# Patient Record
Sex: FEMALE | Race: WHITE | NOT HISPANIC OR LATINO | Employment: FULL TIME | URBAN - METROPOLITAN AREA
[De-identification: names, ages, dates, MRNs, and addresses within clinical notes are randomized per-mention and may not be internally consistent; named-entity substitution may affect disease eponyms.]

---

## 2017-04-04 ENCOUNTER — COMMUNICATION - HEALTHEAST (OUTPATIENT)
Dept: FAMILY MEDICINE | Facility: CLINIC | Age: 22
End: 2017-04-04

## 2017-04-04 DIAGNOSIS — F41.9 ANXIETY: ICD-10-CM

## 2017-06-02 ENCOUNTER — COMMUNICATION - HEALTHEAST (OUTPATIENT)
Dept: FAMILY MEDICINE | Facility: CLINIC | Age: 22
End: 2017-06-02

## 2017-06-02 DIAGNOSIS — F41.9 ANXIETY: ICD-10-CM

## 2017-06-02 DIAGNOSIS — G47.00 INSOMNIA: ICD-10-CM

## 2017-06-13 ENCOUNTER — OFFICE VISIT - HEALTHEAST (OUTPATIENT)
Dept: FAMILY MEDICINE | Facility: CLINIC | Age: 22
End: 2017-06-13

## 2017-06-13 ENCOUNTER — COMMUNICATION - HEALTHEAST (OUTPATIENT)
Dept: TELEHEALTH | Facility: CLINIC | Age: 22
End: 2017-06-13

## 2017-06-13 DIAGNOSIS — F51.01 PRIMARY INSOMNIA: ICD-10-CM

## 2017-06-13 DIAGNOSIS — K21.9 GASTROESOPHAGEAL REFLUX DISEASE WITHOUT ESOPHAGITIS: ICD-10-CM

## 2017-06-13 DIAGNOSIS — F41.9 ANXIETY: ICD-10-CM

## 2017-06-13 DIAGNOSIS — Z30.42 ENCOUNTER FOR SURVEILLANCE OF INJECTABLE CONTRACEPTIVE: ICD-10-CM

## 2017-06-13 DIAGNOSIS — G25.81 RLS (RESTLESS LEGS SYNDROME): ICD-10-CM

## 2017-06-13 DIAGNOSIS — G43.909 MIGRAINE: ICD-10-CM

## 2017-06-13 DIAGNOSIS — F32.9 MAJOR DEPRESSIVE DISORDER, SINGLE EPISODE, UNSPECIFIED: ICD-10-CM

## 2017-07-13 ENCOUNTER — COMMUNICATION - HEALTHEAST (OUTPATIENT)
Dept: FAMILY MEDICINE | Facility: CLINIC | Age: 22
End: 2017-07-13

## 2017-07-24 ENCOUNTER — COMMUNICATION - HEALTHEAST (OUTPATIENT)
Dept: FAMILY MEDICINE | Facility: CLINIC | Age: 22
End: 2017-07-24

## 2017-07-25 ENCOUNTER — OFFICE VISIT - HEALTHEAST (OUTPATIENT)
Dept: FAMILY MEDICINE | Facility: CLINIC | Age: 22
End: 2017-07-25

## 2017-07-25 DIAGNOSIS — N92.1 MENORRHAGIA WITH IRREGULAR CYCLE: ICD-10-CM

## 2017-07-25 DIAGNOSIS — M41.9 SCOLIOSIS: ICD-10-CM

## 2017-07-25 DIAGNOSIS — G43.909 MIGRAINE: ICD-10-CM

## 2017-07-25 DIAGNOSIS — Z00.00 ANNUAL PHYSICAL EXAM: ICD-10-CM

## 2017-07-25 DIAGNOSIS — F32.9 MAJOR DEPRESSIVE DISORDER, SINGLE EPISODE, UNSPECIFIED: ICD-10-CM

## 2017-07-25 DIAGNOSIS — F41.1 ANXIETY STATE: ICD-10-CM

## 2017-07-25 ASSESSMENT — MIFFLIN-ST. JEOR: SCORE: 1863.05

## 2017-08-07 ENCOUNTER — COMMUNICATION - HEALTHEAST (OUTPATIENT)
Dept: FAMILY MEDICINE | Facility: CLINIC | Age: 22
End: 2017-08-07

## 2017-08-07 DIAGNOSIS — F41.9 ANXIETY: ICD-10-CM

## 2017-08-09 ENCOUNTER — RECORDS - HEALTHEAST (OUTPATIENT)
Dept: ADMINISTRATIVE | Facility: OTHER | Age: 22
End: 2017-08-09

## 2017-08-15 ENCOUNTER — COMMUNICATION - HEALTHEAST (OUTPATIENT)
Dept: FAMILY MEDICINE | Facility: CLINIC | Age: 22
End: 2017-08-15

## 2017-08-15 DIAGNOSIS — K21.9 GERD (GASTROESOPHAGEAL REFLUX DISEASE): ICD-10-CM

## 2017-10-02 ENCOUNTER — AMBULATORY - HEALTHEAST (OUTPATIENT)
Dept: FAMILY MEDICINE | Facility: CLINIC | Age: 22
End: 2017-10-02

## 2017-10-02 ENCOUNTER — COMMUNICATION - HEALTHEAST (OUTPATIENT)
Dept: FAMILY MEDICINE | Facility: CLINIC | Age: 22
End: 2017-10-02

## 2017-10-02 DIAGNOSIS — N92.0 HEAVY MENSES: ICD-10-CM

## 2017-10-04 ENCOUNTER — AMBULATORY - HEALTHEAST (OUTPATIENT)
Dept: NURSING | Facility: CLINIC | Age: 22
End: 2017-10-04

## 2017-10-04 DIAGNOSIS — N92.0 HEAVY MENSES: ICD-10-CM

## 2017-12-28 ENCOUNTER — AMBULATORY - HEALTHEAST (OUTPATIENT)
Dept: NURSING | Facility: CLINIC | Age: 22
End: 2017-12-28

## 2017-12-28 ENCOUNTER — COMMUNICATION - HEALTHEAST (OUTPATIENT)
Dept: FAMILY MEDICINE | Facility: CLINIC | Age: 22
End: 2017-12-28

## 2018-02-05 ENCOUNTER — COMMUNICATION - HEALTHEAST (OUTPATIENT)
Dept: FAMILY MEDICINE | Facility: CLINIC | Age: 23
End: 2018-02-05

## 2018-02-05 DIAGNOSIS — G43.909 MIGRAINE: ICD-10-CM

## 2018-02-14 ENCOUNTER — COMMUNICATION - HEALTHEAST (OUTPATIENT)
Dept: FAMILY MEDICINE | Facility: CLINIC | Age: 23
End: 2018-02-14

## 2018-02-14 DIAGNOSIS — K21.9 GERD (GASTROESOPHAGEAL REFLUX DISEASE): ICD-10-CM

## 2018-02-14 DIAGNOSIS — F41.9 ANXIETY: ICD-10-CM

## 2018-03-05 ENCOUNTER — COMMUNICATION - HEALTHEAST (OUTPATIENT)
Dept: FAMILY MEDICINE | Facility: CLINIC | Age: 23
End: 2018-03-05

## 2018-03-05 DIAGNOSIS — G47.00 INSOMNIA: ICD-10-CM

## 2018-03-09 ENCOUNTER — COMMUNICATION - HEALTHEAST (OUTPATIENT)
Dept: SCHEDULING | Facility: CLINIC | Age: 23
End: 2018-03-09

## 2018-03-23 ENCOUNTER — OFFICE VISIT - HEALTHEAST (OUTPATIENT)
Dept: FAMILY MEDICINE | Facility: CLINIC | Age: 23
End: 2018-03-23

## 2018-03-23 DIAGNOSIS — F32.4 MAJOR DEPRESSIVE DISORDER WITH SINGLE EPISODE, IN PARTIAL REMISSION (H): ICD-10-CM

## 2018-03-23 DIAGNOSIS — K21.9 GASTROESOPHAGEAL REFLUX DISEASE WITHOUT ESOPHAGITIS: ICD-10-CM

## 2018-03-23 DIAGNOSIS — F41.1 ANXIETY STATE: ICD-10-CM

## 2018-03-23 DIAGNOSIS — G43.909 MIGRAINE: ICD-10-CM

## 2018-03-23 DIAGNOSIS — Z30.42 ENCOUNTER FOR SURVEILLANCE OF INJECTABLE CONTRACEPTIVE: ICD-10-CM

## 2018-03-23 DIAGNOSIS — F51.01 PRIMARY INSOMNIA: ICD-10-CM

## 2018-03-23 RX ORDER — BUSPIRONE HYDROCHLORIDE 15 MG/1
15 TABLET ORAL 3 TIMES DAILY
Qty: 90 TABLET | Refills: 1 | Status: SHIPPED | OUTPATIENT
Start: 2018-03-23

## 2018-03-23 ASSESSMENT — MIFFLIN-ST. JEOR: SCORE: 1961.81

## 2018-04-29 ENCOUNTER — COMMUNICATION - HEALTHEAST (OUTPATIENT)
Dept: FAMILY MEDICINE | Facility: CLINIC | Age: 23
End: 2018-04-29

## 2018-04-29 DIAGNOSIS — F32.4 MAJOR DEPRESSIVE DISORDER WITH SINGLE EPISODE, IN PARTIAL REMISSION (H): ICD-10-CM

## 2018-05-10 ENCOUNTER — OFFICE VISIT - HEALTHEAST (OUTPATIENT)
Dept: FAMILY MEDICINE | Facility: CLINIC | Age: 23
End: 2018-05-10

## 2018-05-10 ENCOUNTER — COMMUNICATION - HEALTHEAST (OUTPATIENT)
Dept: SCHEDULING | Facility: CLINIC | Age: 23
End: 2018-05-10

## 2018-05-10 DIAGNOSIS — F41.0 PANIC ATTACK: ICD-10-CM

## 2018-05-10 DIAGNOSIS — F32.9 MAJOR DEPRESSIVE DISORDER WITH CURRENT ACTIVE EPISODE: ICD-10-CM

## 2018-05-10 RX ORDER — LORAZEPAM 0.5 MG/1
0.5 TABLET ORAL EVERY 6 HOURS PRN
Qty: 10 TABLET | Refills: 0 | Status: SHIPPED | OUTPATIENT
Start: 2018-05-10

## 2018-06-01 ENCOUNTER — COMMUNICATION - HEALTHEAST (OUTPATIENT)
Dept: SCHEDULING | Facility: CLINIC | Age: 23
End: 2018-06-01

## 2018-06-01 RX ORDER — DULOXETIN HYDROCHLORIDE 20 MG/1
20 CAPSULE, DELAYED RELEASE ORAL DAILY
Qty: 30 CAPSULE | Refills: 0 | Status: SHIPPED | OUTPATIENT
Start: 2018-06-01

## 2018-06-13 ENCOUNTER — COMMUNICATION - HEALTHEAST (OUTPATIENT)
Dept: FAMILY MEDICINE | Facility: CLINIC | Age: 23
End: 2018-06-13

## 2018-06-13 DIAGNOSIS — G43.909 MIGRAINE: ICD-10-CM

## 2018-06-13 DIAGNOSIS — G47.00 INSOMNIA: ICD-10-CM

## 2018-06-14 RX ORDER — SUMATRIPTAN 50 MG/1
TABLET, FILM COATED ORAL
Qty: 10 TABLET | Refills: 11 | Status: SHIPPED | OUTPATIENT
Start: 2018-06-14

## 2018-06-14 RX ORDER — TRAZODONE HYDROCHLORIDE 50 MG/1
TABLET, FILM COATED ORAL
Qty: 90 TABLET | Refills: 3 | Status: SHIPPED | OUTPATIENT
Start: 2018-06-14

## 2018-06-21 ENCOUNTER — AMBULATORY - HEALTHEAST (OUTPATIENT)
Dept: FAMILY MEDICINE | Facility: CLINIC | Age: 23
End: 2018-06-21

## 2018-06-21 ENCOUNTER — COMMUNICATION - HEALTHEAST (OUTPATIENT)
Dept: FAMILY MEDICINE | Facility: CLINIC | Age: 23
End: 2018-06-21

## 2018-06-22 ENCOUNTER — AMBULATORY - HEALTHEAST (OUTPATIENT)
Dept: NURSING | Facility: CLINIC | Age: 23
End: 2018-06-22

## 2018-08-08 ENCOUNTER — COMMUNICATION - HEALTHEAST (OUTPATIENT)
Dept: SCHEDULING | Facility: CLINIC | Age: 23
End: 2018-08-08

## 2019-06-20 ENCOUNTER — THERAPY VISIT (OUTPATIENT)
Dept: PHYSICAL THERAPY | Facility: CLINIC | Age: 24
End: 2019-06-20
Payer: COMMERCIAL

## 2019-06-20 DIAGNOSIS — M54.41 ACUTE RIGHT-SIDED LOW BACK PAIN WITH RIGHT-SIDED SCIATICA: ICD-10-CM

## 2019-06-20 PROCEDURE — 97110 THERAPEUTIC EXERCISES: CPT | Mod: GP | Performed by: PHYSICAL THERAPIST

## 2019-06-20 PROCEDURE — 97035 APP MDLTY 1+ULTRASOUND EA 15: CPT | Mod: GP | Performed by: PHYSICAL THERAPIST

## 2019-06-20 PROCEDURE — 97161 PT EVAL LOW COMPLEX 20 MIN: CPT | Mod: GP | Performed by: PHYSICAL THERAPIST

## 2019-06-20 NOTE — PROGRESS NOTES
[facility] Initial Evaluation  Subjective:  Tania Quesada is a 24 year old female with a lumbar spine condition.    The condition occurred due to insidious onset.  The condition occurred for unknown reasons.  This is a new condition.    Mechanism/History of injury/symptoms: 6/18/2019 patient is received physical therapy orders from her chiropractor for low back pain that began in March 2019.  She denies any specific incident that started the pain that she can recall.  The pain is located right lower lumbar radiating to right buttock and right thigh and the quality of pain is reported as sharp/achy.  The degree of pain is reported as 3/10. The timing of pain/symptoms is worse in the a.m and constant. Associated symptoms include: Stiffness loss of motion    Symptoms are exacerbated by: Sitting, prolonged standing, bending.  Symptoms are relieved by: Ibuprofen, ice.  Since onset symptoms are gradually worsening.    Special tests for this condition include: X-ray.  Previous treatments for this condition include: Chiropractic.    General health as reported by patient is good.  Pertinent medical history includes: Depression, migraines/headaches.  Medical allergies include: None.  Other surgeries include: None.  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  Patient's home/work tasks include: Computer work, lifting, carrying, driving.  Patient is currently working normal job without restrictions.    Potential barriers to physical therapy: None.  Red flags: None.    Previous level of function: Unrestricted sitting and driving or standing without back pain.  Current functional restrictions: Limited ability to stand or sit for prolonged periods due to back pain rating to right leg.    HPI                    Objective:  LUMBAR:    Posture: Fair  Posture Correction: Better  Relevant Lateral Shift: No    Neurological:    Motor Deficit:  Myotomes within normal limits  Sensory Deficit, Reflexes: Within normal limits    Dural Signs:   L R   Slump  + R lumbar ++R lumbar   SLR - +R lumbar       AROM: (Major, Moderate, Minimal or Nil loss)  Movement Loss Jordan Mod Min Nil Pain   Flexion   x x none   Extension  x   R lumbar   Side Gliding L    x none   Side Gliding R    x none     Repeated movement testing:   (During: produces, abolishes, increases, decreases, no effect, centralizing, peripheralizing; After: better, worse, no better, no worse, no effect, centralized, peripheralized)    Pre-test Symptoms Standing: Minimal right-sided low back pain   Symptoms During Symptoms After ROM increased ROM decreased No Effect   FIS  no effect  no effect      Rep FIS  no effect  no effect   extension    EIS  increase  no worse      Rep EIS  increase  no worse  extension     Pre-test Symptoms Lying: No pain   Symptoms During Symptoms After ROM increased ROM decreased No Effect   KAYLA        Rep KAYLA        EIL  produce  no worse       Rep EIL produce No worse extension       Provisional Classification: posterior lumbar derangement  Principle of Management: extension with posture correction when sitting with towel roll/pillow      System    Physical Exam    General     ROS    Assessment/Plan:    Patient is a 24 year old female with lumbar complaints.    Patient has the following significant findings with corresponding treatment plan.                Diagnosis 1:  Low back pain with Right radiculopathy    Pain -  hot/cold therapy, US, electric stimulation, manual therapy, self management, education, directional preference exercise and home program  Decreased ROM/flexibility - manual therapy, therapeutic exercise and home program  Decreased strength - therapeutic exercise, therapeutic activities and home program  Decreased function - therapeutic activities and home program  Impaired posture - neuro re-education and home program    Therapy Evaluation Codes:   1) History  comprised of:   Personal factors that impact the plan of care:      None.    Comorbidity factors that impact the plan of care are:      Depression and Migraines/headaches.     Medications impacting care: Anti-depressant and Sleep.  2) Examination of Body Systems comprised of:   Body structures and functions that impact the plan of care:      Lumbar spine.   Activity limitations that impact the plan of care are:      Driving, Lifting, Sitting and Standing.  3) Clinical presentation characteristics are:   Stable/Uncomplicated.  4) Decision-Making    Low complexity using standardized patient assessment instrument and/or measureable assessment of functional outcome.  Cumulative Therapy Evaluation is: Low complexity.    Previous and current functional limitations:  (See Goal Flow Sheet for this information)    Short term and Long term goals: (See Goal Flow Sheet for this information)     Communication ability:  Patient appears to be able to clearly communicate and understand verbal and written communication and follow directions correctly.  Treatment Explanation - The following has been discussed with the patient:   RX ordered/plan of care  Anticipated outcomes  Possible risks and side effects  This patient would benefit from PT intervention to resume normal activities.   Rehab potential is good.    Frequency:  1 X week, once daily  Duration:  for 6 weeks  Discharge Plan:  Achieve all LTG.  Independent in home treatment program.  Reach maximal therapeutic benefit.    Please refer to the daily flowsheet for treatment today, total treatment time and time spent performing 1:1 timed codes.

## 2019-06-20 NOTE — LETTER
[facility]  [address]  [place] [address]  [phone]    2019    Re: Tania Quesada   :   1995  MRN:  [id number]   REFERRING PHYSICIAN:   [facility]    Date of Initial Evaluation:  19  Visits:  Rxs Used: 1  Reason for Referral:  Acute right-sided low back pain with right-sided sciatica    [facility] Initial Evaluation    Subjective:  Tania Quesada is a 24 year old female with a lumbar spine condition.    The condition occurred due to insidious onset.  The condition occurred for unknown reasons.  This is a new condition.  Mechanism/History of injury/symptoms: 2019 patient is received physical therapy orders from her chiropractor for low back pain that began in 2019.  She denies any specific incident that started the pain that she can recall.  The pain is located right lower lumbar radiating to right buttock and right thigh and the quality of pain is reported as sharp/achy.  The degree of pain is reported as 3/10. The timing of pain/symptoms is worse in the a.m and constant. Associated symptoms include: Stiffness loss of motion  Symptoms are exacerbated by: Sitting, prolonged standing, bending.  Symptoms are relieved by: Ibuprofen, ice.  Since onset symptoms are gradually worsening.  Special tests for this condition include: X-ray.  Previous treatments for this condition include: Chiropractic.  General health as reported by patient is good.  Pertinent medical history includes: Depression, migraines/headaches.  Medical allergies include: None.  Other surgeries include: None.  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  Patient's home/work tasks include: Computer work, lifting, carrying, driving.  Patient is currently working normal job without restrictions.  Potential barriers to physical therapy: None.  Red flags: None.    Previous  level of function: Unrestricted sitting and driving or standing without back pain.  Current functional restrictions: Limited ability to stand or sit for prolonged periods due to back pain rating to right leg.    Objective:  LUMBAR:    Posture: Fair  Posture Correction: Better  Relevant Lateral Shift: No    Neurological:  Motor Deficit: Myotomes within normal limits  Sensory Deficit, Reflexes: Within normal limits    Dural Signs:   L R   Slump  + R lumbar ++R lumbar   SLR - +R lumbar       AROM: (Major, Moderate, Minimal or Nil loss)  Movement Loss Jordan Mod Min Nil Pain   Flexion   x x none   Extension  x   R lumbar   Side Gliding L    x none   Side Gliding R    x none     Repeated movement testing:   (During: produces, abolishes, increases, decreases, no effect, centralizing, peripheralizing; After: better, worse, no better, no worse, no effect, centralized, peripheralized)    Pre-test Symptoms Standing: Minimal right-sided low back pain   Symptoms During Symptoms After ROM increased ROM decreased No Effect   FIS  no effect  no effect      Rep FIS  no effect  no effect   extension    EIS  increase  no worse      Rep EIS  increase  no worse  extension     Pre-test Symptoms Lying: No pain   Symptoms During Symptoms After ROM increased ROM decreased No Effect   KAYLA        Rep KAYLA        EIL  produce  no worse       Rep EIL produce No worse extension       Provisional Classification: posterior lumbar derangement    Principle of Management: extension with posture correction when sitting with towel roll/pillow    Assessment/Plan:    Patient is a 24 year old female with lumbar complaints.    Patient has the following significant findings with corresponding treatment plan.                Diagnosis 1:  Low back pain with Right radiculopathy    Pain -  hot/cold therapy, US, electric stimulation, manual therapy, self management, education, directional preference exercise and home program  Decreased ROM/flexibility - manual  therapy, therapeutic exercise and home program  Decreased strength - therapeutic exercise, therapeutic activities and home program  Decreased function - therapeutic activities and home program  Impaired posture - neuro re-education and home program    Therapy Evaluation Codes:   1) History comprised of:   Personal factors that impact the plan of care:      None.    Comorbidity factors that impact the plan of care are:      Depression and Migraines/headaches.     Medications impacting care: Anti-depressant and Sleep.  2) Examination of Body Systems comprised of:   Body structures and functions that impact the plan of care:      Lumbar spine.   Activity limitations that impact the plan of care are:      Driving, Lifting, Sitting and Standing.  3) Clinical presentation characteristics are:   Stable/Uncomplicated.  4) Decision-Making    Low complexity using standardized patient assessment instrument and/or measureable assessment of functional outcome.    Cumulative Therapy Evaluation is: Low complexity.  Previous and current functional limitations:  (See Goal Flow Sheet for this information)    Short term and Long term goals: (See Goal Flow Sheet for this information)   Communication ability:  Patient appears to be able to clearly communicate and understand verbal and written communication and follow directions correctly.  Treatment Explanation - The following has been discussed with the patient:   RX ordered/plan of care  Anticipated outcomes  Possible risks and side effects  This patient would benefit from PT intervention to resume normal activities.   Rehab potential is good.    Frequency:  1 X week, once daily  Duration:  for 6 weeks  Discharge Plan:  Achieve all LTG.  Independent in home treatment program.  Reach maximal therapeutic benefit.    Thank you for your referral.    INQUIRIES  Therapist: Delano Munoz, PT, DPT, SCS  [facility]  [address] [place] [address]  Phone:  [phone]  Fax: [phone]

## 2019-06-28 ENCOUNTER — THERAPY VISIT (OUTPATIENT)
Dept: PHYSICAL THERAPY | Facility: CLINIC | Age: 24
End: 2019-06-28
Payer: COMMERCIAL

## 2019-06-28 DIAGNOSIS — M54.41 ACUTE RIGHT-SIDED LOW BACK PAIN WITH RIGHT-SIDED SCIATICA: ICD-10-CM

## 2019-06-28 PROCEDURE — 97035 APP MDLTY 1+ULTRASOUND EA 15: CPT | Mod: GP | Performed by: PHYSICAL THERAPIST

## 2019-06-28 PROCEDURE — 97110 THERAPEUTIC EXERCISES: CPT | Mod: GP | Performed by: PHYSICAL THERAPIST

## 2019-10-01 PROBLEM — M54.41 ACUTE RIGHT-SIDED LOW BACK PAIN WITH RIGHT-SIDED SCIATICA: Status: RESOLVED | Noted: 2019-06-20 | Resolved: 2019-10-01

## 2019-10-01 NOTE — PROGRESS NOTES
Patient is discharged from PT.  Please refer to SOAP note dated 6/28/19 for last known functional status as well as final objective/subjective values.

## 2020-01-03 ENCOUNTER — RECORDS - HEALTHEAST (OUTPATIENT)
Dept: ADMINISTRATIVE | Facility: OTHER | Age: 25
End: 2020-01-03

## 2021-05-25 ENCOUNTER — RECORDS - HEALTHEAST (OUTPATIENT)
Dept: ADMINISTRATIVE | Facility: CLINIC | Age: 26
End: 2021-05-25

## 2021-05-31 VITALS — BODY MASS INDEX: 34.84 KG/M2 | WEIGHT: 235.9 LBS

## 2021-05-31 VITALS — BODY MASS INDEX: 34.9 KG/M2 | HEIGHT: 69 IN | WEIGHT: 235.6 LBS

## 2021-06-01 VITALS — WEIGHT: 254.7 LBS | BODY MASS INDEX: 36.46 KG/M2 | HEIGHT: 70 IN

## 2021-06-01 VITALS — BODY MASS INDEX: 37.08 KG/M2 | WEIGHT: 256.7 LBS

## 2021-06-11 NOTE — PROGRESS NOTES
SUBJECTIVE: Tania Quesada is a 22 y.o.   female who presents today  For a med check. She is in need of her Depo-Provera shot. The last one she had with us was last year. She tells me she had one at another facility in between. Either way she is still not within the three month time slot however she tells me she has not had intercourse in greater than one year. Patient is reliable. Patient does Depo-Provera for horrible periods. She notes that she had a worsening of her migraines recently and had a 2 1/2 day bout with a migraine. She needs a refill on her Imitrex. She tells me that her insomnia is better with the trazodone and with the use of ropinirole for her restless leg syndrome. She is currently on 2 mg and believes it's adequate. She is on fluoxetine and BuSpar for depression with anxiety. She was given a PHQ nine and scored zero, she was given NG 87 and scored zero. We discussed weaning down on medication. We also discussed that she is in need of a complete physical so we can get her pap.    OBJECTIVE: /70  Pulse 84  Wt (!) 235 lb 14.4 oz (107 kg)  BMI 34.84 kg/m2  General:  Obese young female in no acute distress  Heart:  Regular rate and rhythm without murmur  Lungs:  Clear bilaterally  Abdomen:  Soft, nontender  Extremities:  Warm, dry and without edema    ASSESSMENT & PLAN:    1. Encounter for surveillance of injectable contraceptive  medroxyPROGESTERone injection 150 mg (DEPO-PROVERA)   2. RLS (restless legs syndrome)     3. Primary insomnia     4. Migraine  SUMAtriptan (IMITREX) 50 MG tablet   5. Major Depression, Single Episode     6. Anxiety  FLUoxetine (PROZAC) 40 MG capsule    busPIRone (BUSPAR) 10 MG tablet   7. Gastroesophageal reflux disease without esophagitis        She was given the Depo-Provera injection today and coached on when she needs to be seen next for repeat. At that time she should have a complete physical so we can do APAP. I refilled her Imitrex. I will  refill her other meds as needed.    Patient Active Problem List   Diagnosis     Major Depression, Single Episode     Anxiety     Vocal Cord Disorder     Heavy menstrual bleeding     Menses painful     Migraine     Scoliosis     Obesity (BMI 30.0-34.9)     Acid reflux     RLS (restless legs syndrome)     Insomnia       Current Outpatient Prescriptions on File Prior to Visit   Medication Sig Dispense Refill     omeprazole (PRILOSEC) 20 MG capsule TAKE 1 CAPSULE BY MOUTH DAILY 90 capsule 1     rOPINIRole (REQUIP) 2 MG tablet Take 1 tablet (2 mg total) by mouth bedtime as needed, may repeat once. 60 tablet 1     traZODone (DESYREL) 50 MG tablet Take 1 tablet (50 mg total) by mouth at bedtime. 30 tablet 0     [DISCONTINUED] busPIRone (BUSPAR) 10 MG tablet TAKE 1 TABLET BY MOUTH TWICE DAILY 180 tablet 1     [DISCONTINUED] FLUoxetine (PROZAC) 40 MG capsule TAKE 2 CAPSULES BY MOUTH DAILY 60 capsule 0     No current facility-administered medications on file prior to visit.

## 2021-06-12 NOTE — PROGRESS NOTES
Assessment:      Healthy female exam.    1. Annual physical exam  Gynecologic Cytology (PAP Smear)   2. Menorrhagia with irregular cycle     3. Major Depression, Single Episode     4. Anxiety     5. Migraine     6. Scoliosis  Ambulatory referral to Orthopedic Surgery     The following high BMI interventions were performed this visit: encouragement to exercise, weight monitoring and lifestyle education regarding diet       Plan:       Await pap smear results.  She will get results via my chart and I will only call with abnormal results.  Referral to [facility] was given for her scoliosis.  Contraception: Depo-Provera injections.  Discussed healthy lifestyle modifications.  Follow up: Return in about 6 months (around 1/25/2018) for 6 month med check.     Subjective:      Tania Quesada is a 22 y.o. female who presents for an annual exam. The patient is not currently sexually active. The patient participates in regular exercise: yes. The patient reports that there is not domestic violence in her life.  Patient is here to get her first Pap smear.  She has not been sexually active currently but is getting Depo-Provera shots for irregular and heavy periods.  She has a history of migraines that worsened when she missed a Depo-Provera shot but that has improved.  She has some depression and anxiety which was well controlled when I saw her last June.  Her restless leg syndrome has improved and she is not sure why.  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  She believes she has a good diet and enjoys fruits and vegetables etc.  She limits snacking food etc.  Her only complaint is she had a period of time for about a month where she was having her scoliosis bother her.  Ibuprofen would help.  It seems to be better now.  She would like to have a referral so that she can be proactive.  She thinks that her past physical therapy helped her  condition a lot and she has not seen a specialist for many years.  We discussed somewhat.  She is up-to-date on her immunizations.    Healthy Habits:   Regular Exercise: Yes  Sunscreen Use: Yes  Healthy Diet: Yes  Dental Visits Regularly: Yes  Seat Belt: Yes  Sexually active: No  Self Breast Exam Monthly:No  Hemoccults: No  Flex Sig: No  Colonoscopy: No  Lipid Profile: Yes  Glucose Screen: Yes  Prevention of Osteoporosis: Yes  Last Dexa: No  Guns at Home:  N/A      Immunization History   Administered Date(s) Administered     DT (pediatric) 08/04/2005     DTaP, historic 1995, 1995, 1995, 10/24/1994, 12/30/1997     HPV Quadrivalent 08/02/2007, 06/17/2008, 12/10/2008     Hep A, historic 08/02/2007, 06/17/2008     Hep B, historic 1993, 1993, 1993     HiB, historic 1995, 1993, 1993, 07/25/1994     IPV 1995, 1993, 10/24/1994, 12/30/1997     Influenza [id number], 01/20/2010     Influenza, inj, historic 12/10/2008, 09/15/2009, 01/05/2012, 11/23/2016     MMR 07/25/1994, 10/03/2003     Meningococcal MCV4P 06/17/2008, 07/25/2012     Tdap 06/17/2008     Varicella 04/13/1998, 06/17/2008     Immunization status: up to date and documented.    No exam data present    Gynecologic History  No LMP recorded. Patient has had an injection.  Contraception: Depo-Provera injections  Last Pap: N/A. Results were: N/A  Last mammogram: N/A. Results were: N/A      OB History   No data available       Current Outpatient Prescriptions   Medication Sig Dispense Refill     busPIRone (BUSPAR) 10 MG tablet TAKE 1 TABLET BY MOUTH TWICE DAILY 180 tablet 1     FLUoxetine (PROZAC) 40 MG capsule TAKE 1 CAPSULE BY MOUTH DAILY 90 capsule 1     omeprazole (PRILOSEC) 20 MG capsule TAKE 1 CAPSULE BY MOUTH DAILY 90 capsule 1     rOPINIRole (REQUIP) 2 MG tablet Take 1 tablet (2 mg total) by mouth bedtime as needed, may repeat once. 60 tablet 1     SUMAtriptan (IMITREX) 50 MG tablet Take 1 tablet (50  mg total) by mouth every 2 (two) hours as needed for migraine. 9 tablet 2     Current Facility-Administered Medications   Medication Dose Route Frequency Provider Last Rate Last Dose     medroxyPROGESTERone injection 150 mg (DEPO-PROVERA)  150 mg Intramuscular Q3 Months Jami Hawkins MD   150 mg at 06/13/17 1149     Past Medical History:   Diagnosis Date     Anxiety      Disorder of vocal cords      GERD (gastroesophageal reflux disease)      Major depression, controlled      Scoliosis      Past Surgical History:   Procedure Laterality Date     VA REMOVE TONSILS/ADENOIDS,<11 Y/O      Description: Tonsillectomy With Adenoidectomy;  Recorded: 04/25/2008;  Comments: 12/98     Cefixime and Cefprozil  Family History   Problem Relation Age of Onset     BOBBI disease Mother      Breast cancer Mother      Asthma Brother      Ovarian cancer Maternal Grandmother      Lung cancer Maternal Grandfather      Diabetes type II Paternal Grandmother      Leukemia Paternal Grandmother      Lung cancer Paternal Grandfather      BOBBI disease Maternal Aunt      Social History     Social History     Marital status: Single     Spouse name: N/A     Number of children: 0     Years of education: 16     Occupational History     student      [id number] for [employer]     Social History Main Topics     Smoking status: Never Smoker     Smokeless tobacco: Never Used     Alcohol use Yes      Comment: 3-4 on weekends     Drug use: No     Sexual activity: No     Other Topics Concern     Not on file     Social History Narrative       Review of Systems  Review of Systems   General ROS: recent spell/fainting? At derm procedure  Psychological ROS: negative  Ophthalmic ROS: negative  ENT ROS: positive for - gags in AM  Allergy and Immunology ROS: negative  Hematological and Lymphatic ROS: negative  Endocrine ROS: negative  Breast ROS: negative  Respiratory ROS: negative  Cardiovascular ROS: negative  Gastrointestinal ROS:  "negative  Genito-Urinary ROS: positive for - dysmenorrhea,on Depo  Musculoskeletal ROS: positive for - back pain  Neurological ROS: negative  Dermatological ROS: positive for recent biopsy--OK        Objective:         Vitals:    07/25/17 1358   BP: 120/60   Pulse: 78   Temp: 99.1  F (37.3  C)   Weight: (!) 235 lb 9.6 oz (106.9 kg)   Height: 5' 9\" (1.753 m)     Body mass index is 34.79 kg/(m^2).    Physical  Physical Exam   General appearance - alert, well appearing, and in no distress and overweight  Mental status - normal mood, behavior, speech, dress, motor activity, and thought processes  Eyes - pupils equal and reactive, extraocular eye movements intact  Ears - bilateral TM's and external ear canals normal  Nose - normal and patent, no erythema, discharge or polyps  Mouth - mucous membranes moist, pharynx normal without lesions  Neck - supple, no significant adenopathy, thyroid exam: thyroid is normal in size without nodules or tenderness  Lymphatics - no palpable lymphadenopathy, no hepatosplenomegaly  Chest - clear to auscultation, no wheezes, rales or rhonchi, symmetric air entry  Heart - normal rate and regular rhythm, S1 and S2 normal, no murmurs noted  Abdomen - soft, nontender, nondistended, no masses or organomegaly  bowel sounds normal  Breasts - breasts appear normal, no suspicious masses, no skin or nipple changes or axillary nodes  Pelvic - normal external genitalia, vulva, vagina, cervix, uterus and adnexa, PAP: Pap smear done today  Back exam - full range of motion, no tenderness, palpable spasm or pain on motion, scoliosis noted   Neurological - alert, oriented, normal speech, no focal findings or movement disorder noted, cranial nerves II through XII intact, DTR's normal and symmetric  Musculoskeletal - no joint tenderness, deformity or swelling  Extremities - peripheral pulses normal, no pedal edema, no clubbing or cyanosis  Skin - normal coloration and turgor, no rashes, no suspicious skin " lesions noted

## 2021-06-17 NOTE — PROGRESS NOTES
Chief Complaint   Patient presents with     Depression     Pt recently switched from Prozac to Wellbutrin and since starting full dose on the Wellbutrin she has been having suicidal thoughts. No active thoughts presently. Pt also has had two panic attacks in the last week with difficulty breathing.        HPI: Very pleasant 23-year-old female who presents today with her mother for evaluation of depression, panic attacks, and suicidal ideation.  She has a long history of depressive issues dating back to when she was in middle school.  She recently transitioned off of fluoxetine in favor of Wellbutrin.  Ever since transitioning to the Wellbutrin her depression symptoms have worsened and she has been having transient thoughts of suicidal ideation.  She has no active plan to harm her self or a method in which she can hurt herself.  There is a shotgun at the home which she is living at, however it is currently unloaded.  She is unsure if it is locked.  She currently lives with her parents and has a strong social support system.  There is no family history of suicide or self-harm that she is aware of.    The patient has experienced 2 panic attacks within the last week.  She has a history of panic attacks in the past, however never at this frequent.  No known triggers, however one started while she was at a concert that was packed with people and another started after fighting with a friend.  The symptoms usually last for about 15 minutes and resolve spontaneously.    ROS:Review of Systems - History obtained from the patient  General ROS: negative  Psychological ROS: positive for - anxiety, depression and suicidal ideation    SH: The Patient's  reports that she has never smoked. She has never used smokeless tobacco. She reports that she drinks alcohol. She reports that she does not use illicit drugs.      FH: The Patient's family history includes Asthma in her brother; Breast cancer in her mother; Diabetes type II in her  paternal grandmother; BOBBI disease in her maternal aunt and mother; Leukemia in her paternal grandmother; Lung cancer in her maternal grandfather and paternal grandfather; Ovarian cancer in her maternal grandmother.     Meds:    Current Outpatient Prescriptions on File Prior to Visit   Medication Sig Dispense Refill     buPROPion (WELLBUTRIN XL) 300 MG 24 hr tablet Take 1 tablet (300 mg total) by mouth daily. 90 tablet 1     busPIRone (BUSPAR) 15 MG tablet Take 1 tablet (15 mg total) by mouth 3 (three) times a day. 90 tablet 1     omeprazole (PRILOSEC) 20 MG capsule TAKE 1 CAPSULE BY MOUTH DAILY 90 capsule 0     SUMAtriptan (IMITREX) 50 MG tablet TAKE 1 TABLET BY MOUTH AS NEEDED FOR MIGRAINE. MAY REPEAT 1 TIME IN A 24 HOUR PERIOD 10 tablet 0     traZODone (DESYREL) 50 MG tablet Take 1 tablet (50 mg total) by mouth at bedtime. 30 tablet 0     No current facility-administered medications on file prior to visit.        O:  /66 (Patient Site: Left Arm, Patient Position: Sitting, Cuff Size: Adult Regular)  Pulse 90  Wt (!) 256 lb 11.2 oz (116.4 kg)  BMI 37.08 kg/m2    Physical Examination: General appearance - alert, well appearing, and in no distress and oriented to person, place, and time  Mental status - alert, oriented to person, place, and time, normal mood, behavior, speech, dress, motor activity, and thought processes      A/P:     Problem List Items Addressed This Visit     None      Visit Diagnoses     Major depressive disorder with current active episode    -  Primary    Relevant Medications    venlafaxine (EFFEXOR XR) 37.5 MG 24 hr capsule    LORazepam (ATIVAN) 0.5 MG tablet    Other Relevant Orders    Ambulatory referral to Psychology    Panic attack        Relevant Medications    LORazepam (ATIVAN) 0.5 MG tablet            1. Major depressive disorder with current active episode  Discussed treatment options and we elected to start an SNRI.  Effexor prescription sent to the pharmacy with directions to  take daily, and call back in 2 weeks with an update.  Hopefully since she is on break from school she will have more time for self-care (exercise, eating healthier).  Stop Wellbutrin.  She is interested in seeing a counselor.  Referral given.  She has a list of psychiatrists available as well.  She was informed that the appointment for a psychiatrist may take up to a few months.  Mom was informed to either have the shock and removed from the house or have it locked up in a gun safe.    - venlafaxine (EFFEXOR XR) 37.5 MG 24 hr capsule; Take 1 capsule (37.5 mg total) by mouth daily.  Dispense: 30 capsule; Refill: 1  - Ambulatory referral to Psychology    2. Panic attack  Limited prescription for lorazepam given to the patient.  She was informed that this is a controlled substance may take up to 3 days for refills if needed.  She was advised that it may cause sedation, and that she should avoid taking with alcohol.  She was also informed that there is a risk for dependence on this medication to use it as sparingly as possible.    - LORazepam (ATIVAN) 0.5 MG tablet; Take 1 tablet (0.5 mg total) by mouth every 6 (six) hours as needed for anxiety.  Dispense: 10 tablet; Refill: 0        Marcus Pham, CNP

## 2021-06-17 NOTE — PROGRESS NOTES
"    SUBJECTIVE: Tania Quesada is a 22 y.o. White or  female who presents today for a med check.  She has a history of depression and anxiety and has been taking low-dose BuSpar twice daily when she remembers and she is taking 40 mg of fluoxetine daily.  She complains of feeling tired all the time and of weight gain.  This is been worse the last couple of months.  She is not sure the fluoxetine is working very well for her anymore.  She has been on it for a long time.  We discussed a change off of it to Wellbutrin which does not cause weight gain and might even help her lose weight.  She would like to try this.  We also discussed that she could increase her BuSpar dosing to help compensate.  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  She also has migraines which have improved.  She has some insomnia and takes trazodone when needed.  She is no longer taking ropinirole because her restless legs symptoms have resolved.  She was given a PHQ 9 today and scored 3.  She scored 5 on her SREE 7.  She needs her Depo-Provera shot today.    OBJECTIVE: /78 (Patient Site: Left Arm, Patient Position: Sitting, Cuff Size: Adult Large)  Pulse 88  Temp 98.9  F (37.2  C) (Oral)   Resp 20  Ht 5' 9.76\" (1.772 m)  Wt (!) 254 lb 11.2 oz (115.5 kg)  Breastfeeding? No  BMI 36.79 kg/m2  General: Obese young female in no acute distress  Heart: Regular rate and rhythm without murmur  Lungs: Clear bilaterally  Abdomen: Soft  Extremities: Warm, dry and without edema  Psych: Mood seems stable    ASSESSMENT & PLAN:    1. Major depressive disorder with single episode, in partial remission  buPROPion (WELLBUTRIN XL) 150 MG 24 hr tablet   2. Anxiety  busPIRone (BUSPAR) 15 MG tablet   3. Encounter for surveillance of injectable contraceptive  medroxyPROGESTERone injection 150 mg (DEPO-PROVERA)   4. Gastroesophageal reflux disease without " esophagitis     5. Migraine     6. Primary insomnia       I will start her on 150 mg of Wellbutrin XL.  She can drop down by half on her fluoxetine.  She can increase her BuSpar and I have given her a new prescription for 15 mg tablets that can be taken 2 or 3 times a day.  She can wean off the fluoxetine altogether and perhaps 6 weeks or so.  She was given her Depo-Provera injection today.  She is to follow-up with me in no longer than 6 months time and preferably sooner, but she is busy and so she can communicate with me through my chart.  We can adjust meds if need be.    Patient Active Problem List   Diagnosis     Major depressive disorder, single episode     Anxiety     Vocal Cord Disorder     Heavy menstrual bleeding     Menses painful     Migraine     Scoliosis     Obesity (BMI 30.0-34.9)     Acid reflux     Insomnia       Current Outpatient Prescriptions on File Prior to Visit   Medication Sig Dispense Refill     omeprazole (PRILOSEC) 20 MG capsule TAKE 1 CAPSULE BY MOUTH DAILY 90 capsule 0     SUMAtriptan (IMITREX) 50 MG tablet TAKE 1 TABLET BY MOUTH AS NEEDED FOR MIGRAINE. MAY REPEAT 1 TIME IN A 24 HOUR PERIOD 10 tablet 0     traZODone (DESYREL) 50 MG tablet Take 1 tablet (50 mg total) by mouth at bedtime. 30 tablet 0     No current facility-administered medications on file prior to visit.

## 2021-06-20 ENCOUNTER — HEALTH MAINTENANCE LETTER (OUTPATIENT)
Age: 26
End: 2021-06-20

## 2021-10-10 ENCOUNTER — HEALTH MAINTENANCE LETTER (OUTPATIENT)
Age: 26
End: 2021-10-10

## 2022-03-17 ENCOUNTER — TRANSFERRED RECORDS (OUTPATIENT)
Dept: HEALTH INFORMATION MANAGEMENT | Facility: CLINIC | Age: 27
End: 2022-03-17
Payer: COMMERCIAL

## 2022-07-16 ENCOUNTER — HEALTH MAINTENANCE LETTER (OUTPATIENT)
Age: 27
End: 2022-07-16

## 2022-09-18 ENCOUNTER — HEALTH MAINTENANCE LETTER (OUTPATIENT)
Age: 27
End: 2022-09-18

## 2023-06-21 ENCOUNTER — TRANSFERRED RECORDS (OUTPATIENT)
Dept: HEALTH INFORMATION MANAGEMENT | Facility: CLINIC | Age: 28
End: 2023-06-21
Payer: COMMERCIAL

## 2023-07-30 ENCOUNTER — HEALTH MAINTENANCE LETTER (OUTPATIENT)
Age: 28
End: 2023-07-30

## 2023-08-14 ENCOUNTER — ANESTHESIA EVENT (OUTPATIENT)
Dept: SURGERY | Facility: HOSPITAL | Age: 28
End: 2023-08-14
Payer: COMMERCIAL

## 2023-08-15 ENCOUNTER — HOSPITAL ENCOUNTER (OUTPATIENT)
Facility: HOSPITAL | Age: 28
Discharge: HOME | End: 2023-08-15
Attending: DENTIST | Admitting: DENTIST
Payer: COMMERCIAL

## 2023-08-15 ENCOUNTER — ANESTHESIA (OUTPATIENT)
Dept: SURGERY | Facility: HOSPITAL | Age: 28
End: 2023-08-15
Payer: COMMERCIAL

## 2023-08-15 VITALS
TEMPERATURE: 98 F | HEART RATE: 64 BPM | DIASTOLIC BLOOD PRESSURE: 59 MMHG | RESPIRATION RATE: 20 BRPM | BODY MASS INDEX: 29.24 KG/M2 | SYSTOLIC BLOOD PRESSURE: 110 MMHG | OXYGEN SATURATION: 98 % | WEIGHT: 202.4 LBS

## 2023-08-15 DIAGNOSIS — K08.491: Primary | ICD-10-CM

## 2023-08-15 PROCEDURE — 250N000009 HC RX 250: Performed by: DENTIST

## 2023-08-15 PROCEDURE — 250N000012 HC RX MED GY IP 250 OP 636 PS 637: Performed by: ANESTHESIOLOGY

## 2023-08-15 PROCEDURE — 250N000009 HC RX 250

## 2023-08-15 PROCEDURE — 370N000017 HC ANESTHESIA TECHNICAL FEE, PER MIN: Performed by: DENTIST

## 2023-08-15 PROCEDURE — 710N000009 HC RECOVERY PHASE 1, LEVEL 1, PER MIN: Performed by: DENTIST

## 2023-08-15 PROCEDURE — 272N000001 HC OR GENERAL SUPPLY STERILE: Performed by: DENTIST

## 2023-08-15 PROCEDURE — 250N000025 HC SEVOFLURANE, PER MIN: Performed by: DENTIST

## 2023-08-15 PROCEDURE — 710N000012 HC RECOVERY PHASE 2, PER MINUTE: Performed by: DENTIST

## 2023-08-15 PROCEDURE — 999N000141 HC STATISTIC PRE-PROCEDURE NURSING ASSESSMENT: Performed by: DENTIST

## 2023-08-15 PROCEDURE — 999N000248 HC STATISTIC IV INSERT WITH US BY RN

## 2023-08-15 PROCEDURE — 258N000003 HC RX IP 258 OP 636: Performed by: ANESTHESIOLOGY

## 2023-08-15 PROCEDURE — 258N000003 HC RX IP 258 OP 636

## 2023-08-15 PROCEDURE — 250N000013 HC RX MED GY IP 250 OP 250 PS 637: Performed by: DENTIST

## 2023-08-15 PROCEDURE — 250N000013 HC RX MED GY IP 250 OP 250 PS 637: Performed by: ANESTHESIOLOGY

## 2023-08-15 PROCEDURE — 250N000011 HC RX IP 250 OP 636

## 2023-08-15 PROCEDURE — 272N000004 HC RX 272: Performed by: DENTIST

## 2023-08-15 PROCEDURE — 250N000011 HC RX IP 250 OP 636: Mod: JZ | Performed by: ANESTHESIOLOGY

## 2023-08-15 PROCEDURE — 250N000009 HC RX 250: Performed by: ANESTHESIOLOGY

## 2023-08-15 PROCEDURE — 360N000075 HC SURGERY LEVEL 2, PER MIN: Performed by: DENTIST

## 2023-08-15 RX ORDER — ONDANSETRON 2 MG/ML
INJECTION INTRAMUSCULAR; INTRAVENOUS PRN
Status: DISCONTINUED | OUTPATIENT
Start: 2023-08-15 | End: 2023-08-15

## 2023-08-15 RX ORDER — CEFAZOLIN SODIUM 1 G/3ML
INJECTION, POWDER, FOR SOLUTION INTRAMUSCULAR; INTRAVENOUS PRN
Status: DISCONTINUED | OUTPATIENT
Start: 2023-08-15 | End: 2023-08-15

## 2023-08-15 RX ORDER — FENTANYL CITRATE 50 UG/ML
INJECTION, SOLUTION INTRAMUSCULAR; INTRAVENOUS PRN
Status: DISCONTINUED | OUTPATIENT
Start: 2023-08-15 | End: 2023-08-15

## 2023-08-15 RX ORDER — CHLORHEXIDINE GLUCONATE ORAL RINSE 1.2 MG/ML
SOLUTION DENTAL PRN
Status: DISCONTINUED | OUTPATIENT
Start: 2023-08-15 | End: 2023-08-15 | Stop reason: HOSPADM

## 2023-08-15 RX ORDER — SCOLOPAMINE TRANSDERMAL SYSTEM 1 MG/1
1 PATCH, EXTENDED RELEASE TRANSDERMAL
Status: DISCONTINUED | OUTPATIENT
Start: 2023-08-15 | End: 2023-08-15 | Stop reason: HOSPADM

## 2023-08-15 RX ORDER — ONDANSETRON 2 MG/ML
4 INJECTION INTRAMUSCULAR; INTRAVENOUS EVERY 30 MIN PRN
Status: DISCONTINUED | OUTPATIENT
Start: 2023-08-15 | End: 2023-08-15 | Stop reason: HOSPADM

## 2023-08-15 RX ORDER — ONDANSETRON 4 MG/1
4 TABLET, ORALLY DISINTEGRATING ORAL EVERY 30 MIN PRN
Status: DISCONTINUED | OUTPATIENT
Start: 2023-08-15 | End: 2023-08-15 | Stop reason: HOSPADM

## 2023-08-15 RX ORDER — HYDROMORPHONE HYDROCHLORIDE 1 MG/ML
0.2 INJECTION, SOLUTION INTRAMUSCULAR; INTRAVENOUS; SUBCUTANEOUS EVERY 5 MIN PRN
Status: DISCONTINUED | OUTPATIENT
Start: 2023-08-15 | End: 2023-08-15 | Stop reason: HOSPADM

## 2023-08-15 RX ORDER — HYDROMORPHONE HYDROCHLORIDE 1 MG/ML
0.4 INJECTION, SOLUTION INTRAMUSCULAR; INTRAVENOUS; SUBCUTANEOUS EVERY 5 MIN PRN
Status: DISCONTINUED | OUTPATIENT
Start: 2023-08-15 | End: 2023-08-15 | Stop reason: HOSPADM

## 2023-08-15 RX ORDER — OXYMETAZOLINE HYDROCHLORIDE 0.05 G/100ML
2 SPRAY NASAL ONCE
Status: COMPLETED | OUTPATIENT
Start: 2023-08-15 | End: 2023-08-15

## 2023-08-15 RX ORDER — CLINDAMYCIN HCL 150 MG
150 CAPSULE ORAL 3 TIMES DAILY
Qty: 21 CAPSULE | Refills: 0 | Status: SHIPPED | OUTPATIENT
Start: 2023-08-15 | End: 2023-08-22

## 2023-08-15 RX ORDER — SODIUM CHLORIDE, SODIUM LACTATE, POTASSIUM CHLORIDE, CALCIUM CHLORIDE 600; 310; 30; 20 MG/100ML; MG/100ML; MG/100ML; MG/100ML
INJECTION, SOLUTION INTRAVENOUS CONTINUOUS
Status: DISCONTINUED | OUTPATIENT
Start: 2023-08-15 | End: 2023-08-15 | Stop reason: HOSPADM

## 2023-08-15 RX ORDER — OXYCODONE HYDROCHLORIDE 5 MG/1
10 TABLET ORAL
Status: DISCONTINUED | OUTPATIENT
Start: 2023-08-15 | End: 2023-08-15 | Stop reason: HOSPADM

## 2023-08-15 RX ORDER — DEXAMETHASONE SODIUM PHOSPHATE 10 MG/ML
INJECTION, SOLUTION INTRAMUSCULAR; INTRAVENOUS PRN
Status: DISCONTINUED | OUTPATIENT
Start: 2023-08-15 | End: 2023-08-15

## 2023-08-15 RX ORDER — OXYCODONE HYDROCHLORIDE 5 MG/1
5-10 TABLET ORAL EVERY 4 HOURS PRN
Qty: 15 TABLET | Refills: 0 | Status: SHIPPED | OUTPATIENT
Start: 2023-08-15

## 2023-08-15 RX ORDER — PROPOFOL 10 MG/ML
INJECTION, EMULSION INTRAVENOUS CONTINUOUS PRN
Status: DISCONTINUED | OUTPATIENT
Start: 2023-08-15 | End: 2023-08-15

## 2023-08-15 RX ORDER — APREPITANT 40 MG/1
40 CAPSULE ORAL ONCE
Status: COMPLETED | OUTPATIENT
Start: 2023-08-15 | End: 2023-08-15

## 2023-08-15 RX ORDER — LIDOCAINE HYDROCHLORIDE AND EPINEPHRINE BITARTRATE 20; .01 MG/ML; MG/ML
INJECTION, SOLUTION SUBCUTANEOUS PRN
Status: DISCONTINUED | OUTPATIENT
Start: 2023-08-15 | End: 2023-08-15 | Stop reason: HOSPADM

## 2023-08-15 RX ORDER — CHLORHEXIDINE GLUCONATE ORAL RINSE 1.2 MG/ML
15 SOLUTION DENTAL 2 TIMES DAILY
Qty: 75 ML | Refills: 0 | Status: SHIPPED | OUTPATIENT
Start: 2023-08-15 | End: 2023-08-18

## 2023-08-15 RX ORDER — KETOROLAC TROMETHAMINE 30 MG/ML
INJECTION, SOLUTION INTRAMUSCULAR; INTRAVENOUS PRN
Status: DISCONTINUED | OUTPATIENT
Start: 2023-08-15 | End: 2023-08-15

## 2023-08-15 RX ORDER — FENTANYL CITRATE 50 UG/ML
25 INJECTION, SOLUTION INTRAMUSCULAR; INTRAVENOUS EVERY 5 MIN PRN
Status: DISCONTINUED | OUTPATIENT
Start: 2023-08-15 | End: 2023-08-15 | Stop reason: HOSPADM

## 2023-08-15 RX ORDER — FENTANYL CITRATE 50 UG/ML
25 INJECTION, SOLUTION INTRAMUSCULAR; INTRAVENOUS
Status: DISCONTINUED | OUTPATIENT
Start: 2023-08-15 | End: 2023-08-15 | Stop reason: HOSPADM

## 2023-08-15 RX ORDER — FENTANYL CITRATE 50 UG/ML
50 INJECTION, SOLUTION INTRAMUSCULAR; INTRAVENOUS EVERY 5 MIN PRN
Status: DISCONTINUED | OUTPATIENT
Start: 2023-08-15 | End: 2023-08-15 | Stop reason: HOSPADM

## 2023-08-15 RX ORDER — GLYCOPYRROLATE 0.2 MG/ML
INJECTION, SOLUTION INTRAMUSCULAR; INTRAVENOUS PRN
Status: DISCONTINUED | OUTPATIENT
Start: 2023-08-15 | End: 2023-08-15

## 2023-08-15 RX ORDER — IBUPROFEN 600 MG/1
600 TABLET, FILM COATED ORAL EVERY 6 HOURS PRN
Qty: 15 TABLET | Refills: 0 | Status: SHIPPED | OUTPATIENT
Start: 2023-08-15

## 2023-08-15 RX ORDER — ACETAMINOPHEN 325 MG/1
975 TABLET ORAL
Status: DISCONTINUED | OUTPATIENT
Start: 2023-08-15 | End: 2023-08-15 | Stop reason: HOSPADM

## 2023-08-15 RX ORDER — LIDOCAINE HYDROCHLORIDE 20 MG/ML
INJECTION, SOLUTION INFILTRATION; PERINEURAL PRN
Status: DISCONTINUED | OUTPATIENT
Start: 2023-08-15 | End: 2023-08-15

## 2023-08-15 RX ORDER — OXYCODONE HYDROCHLORIDE 5 MG/1
5 TABLET ORAL
Status: COMPLETED | OUTPATIENT
Start: 2023-08-15 | End: 2023-08-15

## 2023-08-15 RX ORDER — MEPERIDINE HYDROCHLORIDE 25 MG/ML
12.5 INJECTION INTRAMUSCULAR; INTRAVENOUS; SUBCUTANEOUS EVERY 5 MIN PRN
Status: DISCONTINUED | OUTPATIENT
Start: 2023-08-15 | End: 2023-08-15 | Stop reason: HOSPADM

## 2023-08-15 RX ORDER — PROPOFOL 10 MG/ML
INJECTION, EMULSION INTRAVENOUS PRN
Status: DISCONTINUED | OUTPATIENT
Start: 2023-08-15 | End: 2023-08-15

## 2023-08-15 RX ORDER — LIDOCAINE 40 MG/G
CREAM TOPICAL
Status: DISCONTINUED | OUTPATIENT
Start: 2023-08-15 | End: 2023-08-15 | Stop reason: HOSPADM

## 2023-08-15 RX ADMIN — CEFAZOLIN 2 G: 1 INJECTION, POWDER, FOR SOLUTION INTRAMUSCULAR; INTRAVENOUS at 13:21

## 2023-08-15 RX ADMIN — OXYMETAZOLINE HYDROCHLORIDE 2 SPRAY: 0.05 SPRAY NASAL at 12:52

## 2023-08-15 RX ADMIN — ONDANSETRON 4 MG: 2 INJECTION INTRAMUSCULAR; INTRAVENOUS at 13:55

## 2023-08-15 RX ADMIN — SUGAMMADEX 200 MG: 100 INJECTION, SOLUTION INTRAVENOUS at 13:59

## 2023-08-15 RX ADMIN — DEXMEDETOMIDINE HYDROCHLORIDE 12 MCG: 100 INJECTION, SOLUTION INTRAVENOUS at 13:19

## 2023-08-15 RX ADMIN — ROCURONIUM BROMIDE 50 MG: 50 INJECTION, SOLUTION INTRAVENOUS at 13:10

## 2023-08-15 RX ADMIN — LIDOCAINE HYDROCHLORIDE 5 ML: 20 INJECTION, SOLUTION INFILTRATION; PERINEURAL at 13:10

## 2023-08-15 RX ADMIN — SCOPALAMINE 1 PATCH: 1 PATCH, EXTENDED RELEASE TRANSDERMAL at 12:09

## 2023-08-15 RX ADMIN — APREPITANT 40 MG: 40 CAPSULE ORAL at 12:10

## 2023-08-15 RX ADMIN — MIDAZOLAM HYDROCHLORIDE 2 MG: 1 INJECTION, SOLUTION INTRAMUSCULAR; INTRAVENOUS at 12:25

## 2023-08-15 RX ADMIN — DEXMEDETOMIDINE HYDROCHLORIDE 8 MCG: 100 INJECTION, SOLUTION INTRAVENOUS at 13:44

## 2023-08-15 RX ADMIN — FENTANYL CITRATE 100 MCG: 50 INJECTION, SOLUTION INTRAMUSCULAR; INTRAVENOUS at 13:10

## 2023-08-15 RX ADMIN — SODIUM CHLORIDE, POTASSIUM CHLORIDE, SODIUM LACTATE AND CALCIUM CHLORIDE: 600; 310; 30; 20 INJECTION, SOLUTION INTRAVENOUS at 12:22

## 2023-08-15 RX ADMIN — KETOROLAC TROMETHAMINE 15 MG: 30 INJECTION, SOLUTION INTRAMUSCULAR at 13:55

## 2023-08-15 RX ADMIN — FENTANYL CITRATE 50 MCG: 50 INJECTION, SOLUTION INTRAMUSCULAR; INTRAVENOUS at 14:20

## 2023-08-15 RX ADMIN — PROPOFOL 100 MCG/KG/MIN: 10 INJECTION, EMULSION INTRAVENOUS at 13:10

## 2023-08-15 RX ADMIN — SODIUM CHLORIDE, POTASSIUM CHLORIDE, SODIUM LACTATE AND CALCIUM CHLORIDE: 600; 310; 30; 20 INJECTION, SOLUTION INTRAVENOUS at 14:06

## 2023-08-15 RX ADMIN — DEXAMETHASONE SODIUM PHOSPHATE 10 MG: 10 INJECTION, SOLUTION INTRAMUSCULAR; INTRAVENOUS at 13:10

## 2023-08-15 RX ADMIN — PROPOFOL 200 MG: 10 INJECTION, EMULSION INTRAVENOUS at 13:10

## 2023-08-15 RX ADMIN — FENTANYL CITRATE 50 MCG: 50 INJECTION, SOLUTION INTRAMUSCULAR; INTRAVENOUS at 14:14

## 2023-08-15 RX ADMIN — HYDROMORPHONE HYDROCHLORIDE 0.2 MG: 1 INJECTION, SOLUTION INTRAMUSCULAR; INTRAVENOUS; SUBCUTANEOUS at 14:40

## 2023-08-15 RX ADMIN — GLYCOPYRROLATE 0.2 MG: 0.2 INJECTION INTRAMUSCULAR; INTRAVENOUS at 13:10

## 2023-08-15 RX ADMIN — OXYCODONE HYDROCHLORIDE 5 MG: 5 TABLET ORAL at 15:01

## 2023-08-15 ASSESSMENT — ACTIVITIES OF DAILY LIVING (ADL)
ADLS_ACUITY_SCORE: 35
ADLS_ACUITY_SCORE: 35

## 2023-08-15 NOTE — OP NOTE
[facility]  Operative Note    Pre-operative diagnosis: Generalized anxiety disorder [F41.1]  Esophageal reflux [K21.9]   Post-operative diagnosis * No post-op diagnosis entered *   Procedure: Procedure(s):  EXTRACTION OF WISDOM TEETH NUMBERS 1, 16, 17, 32   Surgeon: Dean Flores DDS   Assistants(s): Joe Rosenbaum   Anesthesia: General    Estimated blood loss: Less than 10 ml    Total IV fluids: (See anesthesia record)   Blood transfusion: No transfusion was given during surgery   Total urine output: (See anesthesia record)  None   Drains: None   Specimens: None   Implants: None     Findings:   None.   Complications: None.   Condition: Stable               Description of procedure: The patient was referred to me for removal of her third molars.  The patient was set up history of pericoronitis.    The patient's past medical history is significant for severe anxiety, gastroesophageal reflux disease.  Due to these issues and the concern for a protected airway during her anesthesia, the patient was brought to the main operating room for anesthesia management during her third molar surgery.  Potential risk and complications were discussed preoperatively to include but not limited to; infection, bleeding, damage to adjacent teeth, mandibular nerve deficits and the potential need for additional surgery or procedures.  The patient's general risk is increased due to her age and tooth locations.    Patient was brought to the main operating room.  She was placed in the operating room table in supine position.  General anesthesia was induced with intravenous agents.  The patient was nasotracheally intubated.  Bilateral breath sounds were confirmed.  There is a small contusion on her lower left lip during the intubation procedure.  Endotracheal tube was secured in the usual fashion.  The patient was prepped and draped for surgery.  A timeout was completed.  Local anesthetic was injected around the  planned surgical sites.  The patient had an oropharyngeal pack placed.    Incision was made along the anterior border of the bilateral ramus extending to the second molars where sulcular incision was made.  A full-thickness flap was elevated.  Lateral ostectomy was completed to expose the crowns of teeth 17 and 32.  The teeth were sectioned horizontally and vertically and removed in segments.  Alveoloplasty was completed.  The wounds were irrigated.  There was generalized oozing from the apex of #32.  Gelfoam was placed in the site passively.  There was good hemostasis.  The wounds were closed primarily with 3-0 Chromic Gut suture.    Incisions were made along the bilateral maxillary tuberosity extending laterally onto the second molars.  Full-thickness flap was elevated.  The teeth were also covered with bone.  Ostectomy was completed and the teeth were removed with elevators and forceps.  Alveoloplasty was completed and the wounds were closed primarily with 3-0 gut suture.  The oral cavity was irrigated suctioned.  There is very good hemostasis.  The oropharyngeal pack was removed.  An orogastric tube was passed suctioned and removed.  All sponge needle counts were correct x2.  The patient will be discharged home later today.

## 2023-08-15 NOTE — ANESTHESIA PREPROCEDURE EVALUATION
Anesthesia Pre-Procedure Evaluation    Patient: Tania Quesada   MRN: [id number] : 1995        Procedure : Procedure(s):  EXTRACTION OF WISDOM TEETH NUMBERS 1, 16, 17, 32          Past Medical History:   Diagnosis Date    Depression     Fainting episodes     associated with anxiety    Gastroesophageal reflux disease without esophagitis     Migraine     Severe anxiety       Past Surgical History:   Procedure Laterality Date    HC REMOVE TONSILS/ADENOIDS,<13 Y/O      Description: Tonsillectomy With Adenoidectomy;  Recorded: 2008;  Comments:       Allergies   Allergen Reactions    Cefixime Hives    Cefprozil Hives      Social History     Tobacco Use    Smoking status: Never    Smokeless tobacco: Never   Substance Use Topics    Alcohol use: Yes     Comment: Alcoholic Drinks/day: 3-4 on weekends      Wt Readings from Last 1 Encounters:   08/15/23 91.8 kg (202 lb 6.4 oz)        Anesthesia Evaluation   Pt has had prior anesthetic.         ROS/MED HX  ENT/Pulmonary:  - neg pulmonary ROS     Neurologic:       Cardiovascular:  - neg cardiovascular ROS     METS/Exercise Tolerance:     Hematologic:       Musculoskeletal:       GI/Hepatic:     (+) GERD, Asymptomatic on medication,                  Renal/Genitourinary:  - neg Renal ROS     Endo:  - neg endo ROS     Psychiatric/Substance Use:     (+) psychiatric history anxiety       Infectious Disease:       Malignancy:       Other:            Physical Exam    Airway        Mallampati: II   TM distance: > 3 FB   Neck ROM: full   Mouth opening: > 3 cm    Respiratory Devices and Support         Dental       (+) Minor Abnormalities - some fillings, tiny chips      Cardiovascular          Rhythm and rate: regular and normal     Pulmonary           breath sounds clear to auscultation           OUTSIDE LABS:  CBC: No results found for: WBC, HGB, HCT, PLT  BMP: No results found for: NA, POTASSIUM, CHLORIDE, CO2, BUN, CR, GLC  COAGS: No results found for: PTT, INR,  FIBR  POC: No results found for: BGM, HCG, HCGS  HEPATIC: No results found for: ALBUMIN, PROTTOTAL, ALT, AST, GGT, ALKPHOS, BILITOTAL, BILIDIRECT, ABI  OTHER: No results found for: PH, LACT, A1C, EMILY, PHOS, MAG, LIPASE, AMYLASE, TSH, T4, T3, CRP, SED    Anesthesia Plan    ASA Status:  2    NPO Status:  NPO Appropriate    Anesthesia Type: General.     - Airway: ETT              Consents    Anesthesia Plan(s) and associated risks, benefits, and realistic alternatives discussed. Questions answered and patient/representative(s) expressed understanding.     - Discussed: Risks, Benefits and Alternatives for BOTH SEDATION and the PROCEDURE were discussed     - Discussed with:  Patient            Postoperative Care    Pain management: Multi-modal analgesia.   PONV prophylaxis: Ondansetron (or other 5HT-3), Dexamethasone or Solumedrol, Scopolamine patch, Background Propofol Infusion, Aprepitant     Comments:    Other Comments: Nasal ETT            Stephanie Hawkins MD

## 2023-08-15 NOTE — ANESTHESIA PROCEDURE NOTES
Airway       Patient location during procedure: OR       Procedure Start/Stop Times: 8/15/2023 1:15 PM  Staff -        CRNA: Geovanny Warner APRN CRNA       Performed By: CRNA  Consent for Airway        Urgency: elective  Indications and Patient Condition       Indications for airway management: delia-procedural       Induction type:intravenous       Mask difficulty assessment: 1 - vent by mask    Final Airway Details       Final airway type: endotracheal airway       Successful airway: Nasal and SHELDON  Endotracheal Airway Details        ETT size (mm): 7.0       Cuffed: yes       Successful intubation technique: video laryngoscopy       VL Blade Size: Glidescope 3       Grade View of Cords: 1       Adjucts: other (comment) (dilation prior to intubation with 28, 30 and 32Fr nasal airways)       Position: Center       Measured from: nares       Secured at (cm): 28       Bite block used: None    Post intubation assessment        Placement verified by: capnometry, equal breath sounds and chest rise        Number of attempts at approach: 1       Number of other approaches attempted: 0       Secured with: silk tape       Ease of procedure: easy       Dentition: Intact and Unchanged       Dental guard used and removed. Dental Guard Type: Proguard Red.    Medication(s) Administered   Medication Administration Time: 8/15/2023 1:15 PM

## 2023-08-15 NOTE — DISCHARGE INSTRUCTIONS
[facility]: What to Expect at Home  Your Recovery  After your procedure, you may have some pain and swelling. You should feel better in a few days. Your doctor can give you medicine for pain. Your doctor will remove the stitches after a few days, if needed.  This care sheet gives you a general idea about how long it will take for you to recover. But each person recovers at a different pace. Follow the steps below to feel better as quickly as possible.  How can you care for yourself at home?  Activity    Relax after surgery. Physical activity may increase bleeding.     Do not lie flat. This may prolong bleeding. Prop up your head with pillows.   Diet    Eat soft foods, such as gelatin, pudding, or a thin soup. Gradually add solid foods to your diet as you heal.     Do not use a straw for the first few days. Sucking on a straw can loosen the blood clot that forms at the surgery site. If this happens, it can delay healing.   Medicines    Your doctor will tell you if and when you can restart your medicines. You will also get instructions about taking any new medicines.     If you stopped taking aspirin or some other blood thinner, your doctor will tell you when to start taking it again.     If your doctor prescribed antibiotics, take them as directed. Do not stop taking them just because you feel better. You need to take the full course of antibiotics.     Take pain medicines exactly as directed.  If the doctor gave you a prescription medicine for pain, take it as prescribed.  If you are not taking a prescription pain medicine, ask your doctor if you can take an over-the-counter medicine.     If you think your pain medicine is making you sick to your stomach:  Take your medicine after meals (unless your doctor has told you not to).  Ask your doctor for a different pain medicine.   Incision care    Bite gently on the gauze pad periodically, and change pads as they become soaked with blood. Call your dentist  or oral surgeon if you still have bleeding 24 hours after your surgery.     While your mouth is numb, be careful not to bite the inside of your cheek or lip, or your tongue.     After 24 hours, gently rinse your mouth with warm salt water several times a day to reduce swelling and relieve pain. Do not rinse hard. This can loosen the blood clot and delay healing.     Avoid rubbing the area with your tongue or touching it with your fingers.     Continue to brush your teeth and tongue carefully.   Ice and heat    Try using an ice pack on the outside of your cheek for the first 24 hours. You can use moist heat--such as a washcloth soaked in warm water and wrung out--for the following 2 or 3 days.   Other instructions    Do not smoke for at least 24 hours after your surgery. The sucking motion can loosen the clot and delay healing. In addition, smoking decreases the blood supply and can bring germs and contaminants to the surgery area.   Follow-up care is a key part of your treatment and safety. Be sure to make and go to all appointments, and call your doctor if you are having problems. It's also a good idea to know your test results and keep a list of the medicines you take.  When should you call for help?   Call 911 anytime you think you may need emergency care. For example, call if:    You passed out (lost consciousness).     You have severe trouble breathing.   Call your doctor now or seek immediate medical care if:    You have pain that does not get better after you take pain medicine.     You have loose stitches, or your incision comes open.     You have new or more bleeding from the site.     You have signs of infection, such as:  Increased pain, swelling, warmth, or redness.  Red streaks leading from the area.  Pus draining from the area.  A fever.     You have new or worse nausea or vomiting.     You are too sick to your stomach to drink any fluids.     You cannot keep down fluids.   Watch closely for any  "changes in your health, and be sure to contact your doctor if you have any problems.  Where can you learn more?  Go to https://www.[employer].net/patiented  Enter M214 in the search box to learn more about \"[facility]: What to Expect at Home.\"  Current as of: November 14, 2022               Content Version: 13.7    6527-3005 [employer].   Care instructions adapted under license by your healthcare professional. If you have questions about a medical condition or this instruction, always ask your healthcare professional. [employer], [employer] disclaims any warranty or liability for your use of this information.      "

## 2023-08-15 NOTE — ANESTHESIA CARE TRANSFER NOTE
Patient: Tania Quesada    Procedure: Procedure(s):  EXTRACTION OF WISDOM TEETH NUMBERS 1, 16, 17, 32       Diagnosis: Generalized anxiety disorder [F41.1]  Esophageal reflux [K21.9]  Diagnosis Additional Information: No value filed.    Anesthesia Type:   General     Note:    Oropharynx: oropharynx clear of all foreign objects  Level of Consciousness: drowsy  Oxygen Supplementation: face mask  Level of Supplemental Oxygen (L/min / FiO2): 6  Independent Airway: airway patency satisfactory and stable  Dentition: dentition unchanged  Vital Signs Stable: post-procedure vital signs reviewed and stable  Report to RN Given: handoff report given  Patient transferred to: PACU    Handoff Report: Identifed the Patient, Identified the Reponsible Provider, Reviewed the pertinent medical history, Discussed the surgical course, Reviewed Intra-OP anesthesia mangement and issues during anesthesia, Set expectations for post-procedure period and Allowed opportunity for questions and acknowledgement of understanding      Vitals:  Vitals Value Taken Time   /68 08/15/23 1411   Temp 97.9    Pulse 99 08/15/23 1411   Resp 14 08/15/23 1411   SpO2 100 % 08/15/23 1411   Vitals shown include unvalidated device data.    Electronically Signed By: ELIANA Howard CRNA  August 15, 2023  2:13 PM

## 2023-08-15 NOTE — ANESTHESIA POSTPROCEDURE EVALUATION
Patient: Tania Quesada    Procedure: Procedure(s):  EXTRACTION OF WISDOM TEETH NUMBERS 1, 16, 17, 32       Anesthesia Type:  General    Note:  Disposition: Outpatient   Postop Pain Control: Uneventful            Sign Out: Well controlled pain   PONV: No   Neuro/Psych: Uneventful            Sign Out: Acceptable/Baseline neuro status   Airway/Respiratory: Uneventful            Sign Out: Acceptable/Baseline resp. status   CV/Hemodynamics: Uneventful            Sign Out: Acceptable CV status; No obvious hypovolemia; No obvious fluid overload   Other NRE:    DID A NON-ROUTINE EVENT OCCUR?            Last vitals:  Vitals Value Taken Time   /60 08/15/23 1425   Temp 36.6  C (97.9  F) 08/15/23 1410   Pulse 83 08/15/23 1430   Resp 10 08/15/23 1430   SpO2 95 % 08/15/23 1430       Electronically Signed By: Stephanie Hawkins MD  August 15, 2023  2:31 PM

## 2024-06-19 ENCOUNTER — TRANSFERRED RECORDS (OUTPATIENT)
Dept: HEALTH INFORMATION MANAGEMENT | Facility: CLINIC | Age: 29
End: 2024-06-19
Payer: COMMERCIAL

## 2024-09-22 ENCOUNTER — HEALTH MAINTENANCE LETTER (OUTPATIENT)
Age: 29
End: 2024-09-22

## 2025-06-13 ENCOUNTER — TRANSFERRED RECORDS (OUTPATIENT)
Dept: HEALTH INFORMATION MANAGEMENT | Facility: CLINIC | Age: 30
End: 2025-06-13
Payer: COMMERCIAL

## (undated) DEVICE — GLOVE BIOGEL PI INDICATOR 8.0 LF 41680

## (undated) DEVICE — SOL NACL 0.9% IRRIG 1000ML BOTTLE 2F7124

## (undated) DEVICE — COUNTER NEEDLE ADH & FOAM 20CT 9106

## (undated) DEVICE — GLOVE BIOGEL PI SZ 8.0 40880

## (undated) DEVICE — GOWN IMPERVIOUS BREATHABLE SMART LG 89015

## (undated) DEVICE — Device

## (undated) DEVICE — SU PLAIN 3-0 FS-1 27" H810H

## (undated) DEVICE — DRSG KERLIX FLUFFS X5

## (undated) DEVICE — GLOVE BIOGEL PI ULTRATOUCH G SZ 8.5 42185

## (undated) DEVICE — SUCTION TIP YANKAUER W/O VENT K86

## (undated) DEVICE — GLOVE BIOGEL PI ULTRATOUCH G SZ 7.0 42170

## (undated) DEVICE — DRSG JAWBRA  95

## (undated) DEVICE — GOWN XXL 9575

## (undated) DEVICE — SOL WATER IRRIG 1000ML BOTTLE 2F7114

## (undated) DEVICE — CUSTOM PACK ORAL PK ST JOHNS

## (undated) RX ORDER — LIDOCAINE HYDROCHLORIDE AND EPINEPHRINE BITARTRATE 20; .01 MG/ML; MG/ML
INJECTION, SOLUTION SUBCUTANEOUS
Status: DISPENSED
Start: 2023-08-15

## (undated) RX ORDER — CEFAZOLIN SODIUM 1 G/3ML
INJECTION, POWDER, FOR SOLUTION INTRAMUSCULAR; INTRAVENOUS
Status: DISPENSED
Start: 2023-08-15

## (undated) RX ORDER — CHLORHEXIDINE GLUCONATE ORAL RINSE 1.2 MG/ML
SOLUTION DENTAL
Status: DISPENSED
Start: 2023-08-15

## (undated) RX ORDER — KETOROLAC TROMETHAMINE 30 MG/ML
INJECTION, SOLUTION INTRAMUSCULAR; INTRAVENOUS
Status: DISPENSED
Start: 2023-08-15

## (undated) RX ORDER — LIDOCAINE HYDROCHLORIDE 10 MG/ML
INJECTION, SOLUTION EPIDURAL; INFILTRATION; INTRACAUDAL; PERINEURAL
Status: DISPENSED
Start: 2023-08-15

## (undated) RX ORDER — FENTANYL CITRATE 50 UG/ML
INJECTION, SOLUTION INTRAMUSCULAR; INTRAVENOUS
Status: DISPENSED
Start: 2023-08-15

## (undated) RX ORDER — PROPOFOL 10 MG/ML
INJECTION, EMULSION INTRAVENOUS
Status: DISPENSED
Start: 2023-08-15